# Patient Record
Sex: MALE | Race: WHITE | NOT HISPANIC OR LATINO | Employment: UNEMPLOYED | URBAN - METROPOLITAN AREA
[De-identification: names, ages, dates, MRNs, and addresses within clinical notes are randomized per-mention and may not be internally consistent; named-entity substitution may affect disease eponyms.]

---
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
## 2023-08-31 ENCOUNTER — OFFICE VISIT (OUTPATIENT)
Dept: URGENT CARE | Facility: URGENT CARE | Age: 23
End: 2023-08-31
Payer: COMMERCIAL

## 2023-08-31 VITALS
RESPIRATION RATE: 18 BRPM | TEMPERATURE: 100 F | SYSTOLIC BLOOD PRESSURE: 113 MMHG | DIASTOLIC BLOOD PRESSURE: 62 MMHG | WEIGHT: 165 LBS | HEART RATE: 116 BPM | BODY MASS INDEX: 22.59 KG/M2 | OXYGEN SATURATION: 98 %

## 2023-08-31 DIAGNOSIS — R10.84 ABDOMINAL PAIN, GENERALIZED: Primary | ICD-10-CM

## 2023-08-31 DIAGNOSIS — R00.0 TACHYCARDIA: ICD-10-CM

## 2023-08-31 DIAGNOSIS — R11.2 NAUSEA AND VOMITING, UNSPECIFIED VOMITING TYPE: ICD-10-CM

## 2023-08-31 DIAGNOSIS — R50.9 FEVER, UNSPECIFIED FEVER CAUSE: ICD-10-CM

## 2023-08-31 PROCEDURE — 99203 OFFICE O/P NEW LOW 30 MIN: CPT | Performed by: NURSE PRACTITIONER

## 2023-08-31 RX ORDER — FAMOTIDINE 20 MG/1
20 TABLET, FILM COATED ORAL
COMMUNITY
Start: 2023-08-23 | End: 2023-09-06

## 2023-09-01 NOTE — PROGRESS NOTES
Assessment & Plan     Abdominal pain, generalized    Tachycardia    Fever, unspecified fever cause    Nausea and vomiting, unspecified vomiting type       Recommend further evaluation in emergency room for abdominal pain with fever, nausea and vomiting, tachycardia as stat labs indicated, possibly advanced imaging with concern for systemic infection. Patient agreeable and declines ambulance and is discharged in stable condition. Recommend establish care with PCP      Aileen Carmona NP  [facility] [place]    Subjective     Prashant is a 22 year old male who presents to clinic today for the following health issues:  Chief Complaint   Patient presents with    Urgent Care    Abdominal Pain     Per patient states he has been having abdominal pain and fever, was seen in ED and given pepcid which was working but states yesterday started having symptoms again      Abdominal Pain    Location: periumbilical and left side  Radiation: back    Severity: 2 on a scale of 1-10.  Was 8/10 before tylenol just now  Duration: unsure  Course of Illness: worsening.  Exacerbated by: nothing  Relieved by: tylenol helps temporarily, last had 2 hours ago  Associated Symptoms: fever of 102F, emesis this morning  Denies diarrhea, dysuria, urgency, frequency, constipation, sore throat.   Surgical History: infant hernia repair  He was evalauted in ED 8/22/223 for epigastric pain and prescribed Pepcid which had been helping and now isn't helping. He was advised to return with fever of 102 which he states wasn't aware of. He does not have PCP  He hasn't been able to eat for 24 hours since he is afraid of throwing up.  He has been able to drink fluids and void      Problem list, Medication list, Allergies, and Medical history reviewed in EPIC.    ROS:  Review of systems negative except for noted above        Objective    /62   Pulse 116   Temp 100  F (37.8  C) (Tympanic)   Resp 18   Wt 74.8 kg (165 lb)   SpO2 98%    BMI 22.59 kg/m    Physical Exam  Constitutional:       General: He is not in acute distress.     Appearance: He is not toxic-appearing or diaphoretic.   Cardiovascular:      Rate and Rhythm: Regular rhythm. Tachycardia present.      Heart sounds: Normal heart sounds.   Abdominal:      General: Abdomen is flat. Bowel sounds are normal. There is no distension.      Palpations: Abdomen is rigid.      Tenderness: There is generalized abdominal tenderness. There is no right CVA tenderness, left CVA tenderness, guarding or rebound.      Comments: Severe tenderness with palpation throughout abdomen   Skin:     General: Skin is warm and dry.   Neurological:      Mental Status: He is alert.

## 2025-05-20 ENCOUNTER — OFFICE VISIT (OUTPATIENT)
Dept: FAMILY MEDICINE | Facility: CLINIC | Age: 25
End: 2025-05-20
Payer: COMMERCIAL

## 2025-05-20 ENCOUNTER — NURSE TRIAGE (OUTPATIENT)
Dept: PEDIATRICS | Facility: CLINIC | Age: 25
End: 2025-05-20

## 2025-05-20 VITALS
WEIGHT: 177 LBS | DIASTOLIC BLOOD PRESSURE: 75 MMHG | BODY MASS INDEX: 23.98 KG/M2 | RESPIRATION RATE: 16 BRPM | HEIGHT: 72 IN | OXYGEN SATURATION: 98 % | SYSTOLIC BLOOD PRESSURE: 120 MMHG | HEART RATE: 95 BPM | TEMPERATURE: 98 F

## 2025-05-20 DIAGNOSIS — F48.1: ICD-10-CM

## 2025-05-20 DIAGNOSIS — F44.9 DISASSOCIATION DISORDER: Primary | ICD-10-CM

## 2025-05-20 PROCEDURE — 3078F DIAST BP <80 MM HG: CPT | Performed by: INTERNAL MEDICINE

## 2025-05-20 PROCEDURE — 90471 IMMUNIZATION ADMIN: CPT | Performed by: INTERNAL MEDICINE

## 2025-05-20 PROCEDURE — 90715 TDAP VACCINE 7 YRS/> IM: CPT | Performed by: INTERNAL MEDICINE

## 2025-05-20 PROCEDURE — 99213 OFFICE O/P EST LOW 20 MIN: CPT | Mod: 25 | Performed by: INTERNAL MEDICINE

## 2025-05-20 PROCEDURE — 3074F SYST BP LT 130 MM HG: CPT | Performed by: INTERNAL MEDICINE

## 2025-05-20 PROCEDURE — 1126F AMNT PAIN NOTED NONE PRSNT: CPT | Performed by: INTERNAL MEDICINE

## 2025-05-20 ASSESSMENT — ASTHMA QUESTIONNAIRES
QUESTION_1 LAST FOUR WEEKS HOW MUCH OF THE TIME DID YOUR ASTHMA KEEP YOU FROM GETTING AS MUCH DONE AT WORK, SCHOOL OR AT HOME: NONE OF THE TIME
QUESTION_4 LAST FOUR WEEKS HOW OFTEN HAVE YOU USED YOUR RESCUE INHALER OR NEBULIZER MEDICATION (SUCH AS ALBUTEROL): NOT AT ALL
QUESTION_2 LAST FOUR WEEKS HOW OFTEN HAVE YOU HAD SHORTNESS OF BREATH: NOT AT ALL
QUESTION_5 LAST FOUR WEEKS HOW WOULD YOU RATE YOUR ASTHMA CONTROL: COMPLETELY CONTROLLED
QUESTION_3 LAST FOUR WEEKS HOW OFTEN DID YOUR ASTHMA SYMPTOMS (WHEEZING, COUGHING, SHORTNESS OF BREATH, CHEST TIGHTNESS OR PAIN) WAKE YOU UP AT NIGHT OR EARLIER THAN USUAL IN THE MORNING: NOT AT ALL
ACT_TOTALSCORE: 25

## 2025-05-20 ASSESSMENT — PAIN SCALES - GENERAL: PAINLEVEL_OUTOF10: NO PAIN (0)

## 2025-05-20 NOTE — PROGRESS NOTES
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  The foreign body was removed under general anesthesia.  Since that time he has had episodes of feeling like he is not connected to his body.  He does not have seizure activity.  He is aware of his surroundings during the occurrence.  It was intermittent, but now it is more persistent.  Plan: He is wondering if this is a neurologic problem.  I am concerned that this may be more of a psychiatric issue.  I think it is unlikely to be a lesion in his brain.  Will get a CT scan to rule out significant pathology.  Will refer him to psychiatry.  Will also make a referral to neurology.  I think a neurology appointment will likely be further down the road.  I think we will get the answer from the psychiatrist.      Lucho Cerda is a 24 year old, presenting for the following health issues:  Altered Mental Status        5/20/2025    11:47 AM   Additional Questions   Roomed by Melvina PARKS   Accompanied by Self     History of Present Illness       Reason for visit:  Disassociation-derealization full body numbness  Symptom onset:  More than a month  Symptoms include:  Mental groggyness, delay of processing, motor capability impaired  Symptom intensity:  Moderate  Symptom progression:  Staying the same  Had these symptoms before:  Yes  Has tried/received treatment for these symptoms:  No  What makes it better:  Usually when i sleep and wake up its gone,but its not going away this time,its been about 3 days   He is taking medications regularly.      24-year-old male presents for evaluation of altered mental status.  He had a minor surgery on 4/18/2025.  He had a rectal foreign body removed.  Since that time he is having trouble with his thought processes.  He describes having altered sensations all over his body.  He describes a sensation  that he feels like he is not in his own body and that he does not know where his body is in relation to space.  He has a history of obsessive-compulsive disorder, oppositional defiant disorder, skin picking disorder and attention deficit disorder.  He denies any history of great emotional trauma.  He denies having PTSD.  He is currently not on any medications.  He pulls up a website on his phone and points to a website with derealization syndrome.  He feels that this fits his symptomatology.  He also describes what sounds like disassociation.  He does not feel like he is actually experiencing what is going on.  He is not having trouble driving.  He is not having suicidal thoughts.  He does not feel like he is anxious or depressed.  He does note some stress caring for his young child.        Objective    /75   Pulse 95   Temp 98  F (36.7  C) (Tympanic)   Resp 16   Ht 1.829 m (6')   Wt 80.3 kg (177 lb)   SpO2 98%   BMI 24.01 kg/m    Body mass index is 24.01 kg/m .  Physical Exam   GENERAL: alert and no distress  EYES: PERRL, EOMI, conjunctivae and sclerae normal, and fundi benign-no diabetic or hypertensive changes seen  HENT: ear canals and TM's normal, nose and mouth without ulcers or lesions  NECK: no adenopathy, no asymmetry, masses, or scars  RESP: lungs clear to auscultation - no rales, rhonchi or wheezes  CV: regular rate and rhythm, normal S1 S2, no S3 or S4, no murmur, click or rub, no peripheral edema  ABDOMEN: soft, nontender, no hepatosplenomegaly, no masses and bowel sounds normal  MS: no gross musculoskeletal defects noted, no edema  NEURO: Normal strength and tone, mentation intact and speech normal  PSYCH: mentation appears normal, affect normal/bright            Signed Electronically by: Prashant Jasmine MD

## 2025-05-20 NOTE — TELEPHONE ENCOUNTER
Yes, he should be seen. I don't see a need for ER given that the problem has been going on for 4 weeks.  Prashant Jasmine MD on 5/20/2025 at 9:00 AM

## 2025-05-20 NOTE — TELEPHONE ENCOUNTER
Left vague message on Ari's (father) VM. Advised him to keep appointment with Dr. Jasmine for today to be evaluated.     Thank you - Halle Pina, BSN, RN

## 2025-05-20 NOTE — TELEPHONE ENCOUNTER
"Nurse Triage SBAR    Is this a 2nd Level Triage? YES, LICENSED PRACTITIONER REVIEW IS REQUIRED    Situation: Patient father, no c2c, patient sleeping taking information only. Priority triaged after already getting appointment by central scheduling. Abnormal neurologic sensations since having procedure on 04/18 at [facility] to remove foreign body from rectum.     Background: Father describes \"episodes\" of trouble with thought process and neurologic sensation that are recurrent. Denies acute stroke concerns right now, but wondering if Mini strokes ongoing?     Assessment: Patient has episodes that last a few days, then resolves, only to return. Current episode started Sunday. Patient having moments where he is needing to slow down to formulate thought process. Also having decreased sensation to touch over body. Described as similar to when arm falls asleep, can not feel. Father denies patient having facial droop, slurred speech, motor function loss, or headache. Patient has stated he feels like he is not in his own body, and he doesn't know where his body is in relation to space. He is still going to work and driving his car etc.     Protocol Recommended Disposition:   See in Office Today    Recommendation: Per RN protocol patient should be clinic appropriate, but given acute symptoms routing to provider team. Father does not want to take patient to ED, but understands if needing to see neurology.     Routed to provider    Does the patient meet one of the following criteria for ADS visit consideration? 16+ years old, with an [facility] PCP     TIP  Providers, please consider if this condition is appropriate for management at one of our Acute and Diagnostic Services sites.     If patient is a good candidate, please use dotphrase <dot>triageresponse and select Refer to ADS to document.      Reason for Disposition   Patient wants to be seen    Additional Information   Negative: Headache (with neurologic deficit)   Negative: " Unable to urinate (or only a few drops) and bladder feels very full   Negative: Loss of bladder or bowel control (urine or bowel incontinence; wetting self, leaking stool) of new-onset   Negative: Back pain with numbness (loss of sensation) in groin or rectal area   Negative: Neurologic deficit that was brief (now gone), ANY of the following: * Weakness of the face, arm, or leg on one side of the body * Numbness of the face, arm, or leg on one side of the body * Loss of speech or garbled speech   Negative: Patient sounds very sick or weak to the triager   Negative: Neurologic deficit of gradual onset (e.g., days to weeks), ANY of the following: * Weakness of the face, arm, or leg on one side of the body* Numbness of the face, arm, or leg on one side of the body* Loss of speech or garbled speech   Negative: [place] palsy suspected (i.e., weakness only one side of the face, developing over hours to days, no other symptoms)   Negative: Tingling (e.g., pins and needles) of the face, arm or leg on one side of the body, that is present now  (Exceptions: Chronic or recurrent symptom lasting > 4 weeks; or from known cause, such as: bumped elbow, carpal tunnel, pinched nerve.)   Negative: Back pain (with neurologic deficit)   Negative: Neck pain (with neurologic deficit)   Negative: Confusion, disorientation, or hallucinations is main symptom   Negative: Dizziness is main symptom   Negative: Followed a head injury within last 3 days   Negative: SEVERE weakness (e.g., unable to walk or barely able to walk, requires support) and new-onset or getting worse   Negative: Difficult to awaken or acting confused (e.g., disoriented, slurred speech)   Negative: Sudden onset of weakness of the face, arm or leg on one side of the body and present now (Exception: Bell's palsy suspected: weakness on one side of the face developing over hours to days, with no other symptoms.)   Negative: Sudden onset of numbness of the face, arm or leg on one  side of the body and present now   Negative: Sudden onset of loss of speech or garbled speech and present now   Negative: Sounds like a life-threatening emergency to the triager    Protocols used: Neurologic Deficit-A-[place]

## 2025-05-21 ENCOUNTER — TELEPHONE (OUTPATIENT)
Dept: NEUROLOGY | Facility: CLINIC | Age: 25
End: 2025-05-21
Payer: COMMERCIAL

## 2025-05-21 ENCOUNTER — PATIENT OUTREACH (OUTPATIENT)
Dept: CARE COORDINATION | Facility: CLINIC | Age: 25
End: 2025-05-21
Payer: COMMERCIAL

## 2025-05-21 ASSESSMENT — PATIENT HEALTH QUESTIONNAIRE - PHQ9
SUM OF ALL RESPONSES TO PHQ QUESTIONS 1-9: 4
SUM OF ALL RESPONSES TO PHQ QUESTIONS 1-9: 4
10. IF YOU CHECKED OFF ANY PROBLEMS, HOW DIFFICULT HAVE THESE PROBLEMS MADE IT FOR YOU TO DO YOUR WORK, TAKE CARE OF THINGS AT HOME, OR GET ALONG WITH OTHER PEOPLE: VERY DIFFICULT

## 2025-05-21 NOTE — TELEPHONE ENCOUNTER
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  The foreign body was removed under general anesthesia.  Since that time he has had episodes of feeling like he is not connected to his body.  He does not have seizure activity.  He is aware of his surroundings during the occurrence.  It was intermittent, but now it is more persistent.  Plan: He is wondering if this is a neurologic problem.  I am concerned that this may be more of a psychiatric issue.  I think it is unlikely to be a lesion in his brain.  Will get a CT scan to rule out significant pathology.  Will refer him to psychiatry.  Will also make a referral to neurology.  I think a neurology appointment will likely be further down the road.  I think we will get the answer from the psychiatrist.

## 2025-05-21 NOTE — TELEPHONE ENCOUNTER
Spoke to patient, mom was in the background. Relayed that it would be recommended to complete CT, then schedule mental health referral to rule out psychiatric disorder first for a more beneficial neurology visit. Pt agreeable to plan. They are requesting to still schedule neurology visit to be on the books and then will cancel if needed. [employer] link sent to patient to sign-up. He is requesting mental health referral # be sent to him once he signs up.

## 2025-05-21 NOTE — TELEPHONE ENCOUNTER
REASON FOR VISIT: F44.9 (ICD-10-CM) - Disassociation disorder  F48.1 (ICD-10-CM) - Derealization syndrome (H)   PROVIDER: Deangelo Cleveland DO   DATE OF APPT: 5/22/25 @ 11:00 am    NOTES (FOR ALL VISITS) STATUS DETAILS   OFFICE NOTE from referring provider Internal 5/20/25 Prashant Jasmine MD @[facility]     MEDICATION LIST Internal    IMAGING  (FOR ALL VISITS)     CT (HEAD, NECK, SPINE) In process [facility]  ([facility]) 6/17/25 CT Head

## 2025-05-21 NOTE — TELEPHONE ENCOUNTER
[facility] Call Center    Phone Message    May a detailed message be left on voicemail: yes     Reason for Call: Other: Pt attempting to schedule 05/20 referral. Writer uncertain of how this should be scheduled, requesting review    Please contact pt to schedule at [phone]    Action Taken: Message routed to:  [facility] Neurology    Travel Screening: Not Applicable     Date of Service:

## 2025-05-21 NOTE — TELEPHONE ENCOUNTER
With reviewing, it is best to be complete head CT first, then schedule mental health referral. Once these are completed and mental health believes that patient needs to see neurology, then we can assist with scheduling.

## 2025-05-22 ENCOUNTER — PRE VISIT (OUTPATIENT)
Dept: NEUROLOGY | Facility: CLINIC | Age: 25
End: 2025-05-22

## 2025-05-22 ENCOUNTER — VIRTUAL VISIT (OUTPATIENT)
Dept: NEUROLOGY | Facility: CLINIC | Age: 25
End: 2025-05-22
Payer: COMMERCIAL

## 2025-05-22 ENCOUNTER — VIRTUAL VISIT (OUTPATIENT)
Facility: CLINIC | Age: 25
End: 2025-05-22
Attending: INTERNAL MEDICINE
Payer: COMMERCIAL

## 2025-05-22 VITALS — BODY MASS INDEX: 23.7 KG/M2 | HEIGHT: 72 IN | WEIGHT: 175 LBS

## 2025-05-22 DIAGNOSIS — F48.1: ICD-10-CM

## 2025-05-22 DIAGNOSIS — F44.9 DISASSOCIATION DISORDER: Primary | ICD-10-CM

## 2025-05-22 DIAGNOSIS — F44.9 DISASSOCIATION DISORDER: ICD-10-CM

## 2025-05-22 DIAGNOSIS — F90.2 ATTENTION DEFICIT HYPERACTIVITY DISORDER (ADHD), COMBINED TYPE: Primary | ICD-10-CM

## 2025-05-22 ASSESSMENT — ANXIETY QUESTIONNAIRES
1. FEELING NERVOUS, ANXIOUS, OR ON EDGE: SEVERAL DAYS
GAD7 TOTAL SCORE: 3
5. BEING SO RESTLESS THAT IT IS HARD TO SIT STILL: NOT AT ALL
3. WORRYING TOO MUCH ABOUT DIFFERENT THINGS: NOT AT ALL
IF YOU CHECKED OFF ANY PROBLEMS ON THIS QUESTIONNAIRE, HOW DIFFICULT HAVE THESE PROBLEMS MADE IT FOR YOU TO DO YOUR WORK, TAKE CARE OF THINGS AT HOME, OR GET ALONG WITH OTHER PEOPLE: VERY DIFFICULT
GAD7 TOTAL SCORE: 3
2. NOT BEING ABLE TO STOP OR CONTROL WORRYING: NOT AT ALL
6. BECOMING EASILY ANNOYED OR IRRITABLE: NOT AT ALL
7. FEELING AFRAID AS IF SOMETHING AWFUL MIGHT HAPPEN: SEVERAL DAYS

## 2025-05-22 ASSESSMENT — PATIENT HEALTH QUESTIONNAIRE - PHQ9: 5. POOR APPETITE OR OVEREATING: SEVERAL DAYS

## 2025-05-22 ASSESSMENT — PAIN SCALES - GENERAL: PAINLEVEL_OUTOF10: MODERATE PAIN (5)

## 2025-05-22 NOTE — NURSING NOTE
Current patient location: [id number] [place] [id number]    Is the patient currently in the state of [place]? YES    Visit mode: VIDEO    If the visit is dropped, the patient can be reconnected by:VIDEO VISIT: Text to cell phone:   Telephone Information:   Mobile [phone]       Will anyone else be joining the visit? NO  (If patient encounters technical issues they should call [phone]    Are changes needed to the allergy or medication list? No    Are refills needed on medications prescribed by this physician? NO    Rooming Documentation:  Questionnaire(s) completed    Reason for visit: Consult    Deanna MARTINES

## 2025-05-22 NOTE — LETTER
5/22/2025       RE: Prashant Doe  [address] [facility] [id number]     Dear Colleague,    Thank you for referring your patient, Prashant Doe, to the [facility] [place] at [facility]. Please see a copy of my visit note below.    [facility] Neurology Consultation    Prashant Doe MRN# [id number]   Age: 24 year old YOB: 2000     Requesting physician: No ref. 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  His presentation today is further supportive of a psychological response to a physical/emotional trauma as he is having symptoms of dissociation and sensory changes during the visit and for the last 4 days.  If this was indeed a focal seizure or generalized seizure there is little chance the patient could have that condition for 4 days and be as active, and responsive as he is on interview.  The lack of other neurological symptoms is also reassuring that this isn't some autoimmune receptor encephalitis, infection, or atypical seizure disorder that happened to correlate with the prior surgery.  He could continue with plans for head imaging as ordered, but if new symptoms did emerge then consideration for MRI or EEG may be made.    We discussed that I think his best option for management of his symptoms is psychological at this time, and he is meeting with a provider later today to go over treatment. He doesn't meet criteria for mandatory driving restrictions as per [place] state law, but I advised that if he is uncomfortable driving he could stop for  "now and see how psychology based treatment goes.    Plan:  - Continue with plans for CT head  - Can consider EEG or MRI brain if new neurological symptoms were to occur   - Altered mental status for brief periods of time, stereotyped behaviors/movements, auras/agata-vu/smells/visual hallucinations    Follow up in Neurology clinic  should new concerns arise.    ALBERTA Cleveland D.O.   of Neurology    Total time today (55 min) in this patient encounter was spent on pre-charting, counseling and/or coordination of care.  The patient is in agreement with this plan and has no further questions.      History of Presenting Symptoms:   Prashant Doe is a 24 year old male who presents today for evaluation of varied events of mental status.  The patient has a pertinent medical history of OCD, ADHD, oppositional defiant disorder, skin picking disorder, and some mild asthma.    The patient was seen 4/18/2025 with [facility] due to having a rectal foreign body. This was removed via a manual transanal approach in the ED.  Following the removal, a nurse triage communication 5/20/2025 indicated the patient was having \"episodes\" of trouble with thought process and decreased sensation to touch all over the body and feeling like they were outside of their body or that they were not in their own body.  PCP visit 5/20/2025 led to both a neurology referral and psychiatric referral.     Today, the patient is with his father today.  He indicates having a few moments where he couldn't feel his body but that his brain works fine occurring after his rectal procedure.  This feeling moves from his head down into his entire body.  This would not allow him to feel other stimulus, such as pain or cold sensations. He has trouble breathing during these events, and feels his heart rate increases during the events.  There is no LOC associated with these events.  There is no clear atypical body movements he cannot control.  He " just feels dissociative from his body during the event. Events initially were lasting a few hours to a day.  He has had about 4 events in total. He currently has an event, which is going on for 4 days.    He describes having a concussion as a child, and that this led to increased stress and headaches especially when looking at bright lights. There is no history of seizure. No milestones declines are noted. He did well in school. 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       Medications:     No current outpatient medications on file.     No current facility-administered medications for this visit.      Physical Exam:   General: Seated comfortably in no acute distress. Head down, low volume voice, not making much eye contact with camera or dad in room.  Neurologic:     Mental Status: Fully alert, attentive and oriented. Speech clear and fluent, no paraphasic errors.     Cranial Nerves: EOMI with normal smooth pursuit. Facial movements symmetric. Hearing not formally tested but intact to conversation.  No dysarthria.     Motor: No tremors or other abnormal movements observed.          Data: Pertinent prior to visit   Imaging:  None pertinent      Virtual Visit Details    Type of service:  Video Visit   Video Start Time: 1055  Video End Time:11:23 AM    Originating Location (pt. Location): Home    Distant Location (provider location):  On-site  Platform used for Video Visit: AmWell    Again, thank you for allowing me to participate in the care of your patient.      Sincerely,    Deangelo Cleveland, DO

## 2025-05-22 NOTE — PROGRESS NOTES
[facility] Neurology Consultation    Prashant Doe MRN# [id number]   Age: 24 year old YOB: 2000     Requesting physician: No ref. 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  His presentation today is further supportive of a psychological response to a physical/emotional trauma as he is having symptoms of dissociation and sensory changes during the visit and for the last 4 days.  If this was indeed a focal seizure or generalized seizure there is little chance the patient could have that condition for 4 days and be as active, and responsive as he is on interview.  The lack of other neurological symptoms is also reassuring that this isn't some autoimmune receptor encephalitis, infection, or atypical seizure disorder that happened to correlate with the prior surgery.  He could continue with plans for head imaging as ordered, but if new symptoms did emerge then consideration for MRI or EEG may be made.    We discussed that I think his best option for management of his symptoms is psychological at this time, and he is meeting with a provider later today to go over treatment. He doesn't meet criteria for mandatory driving restrictions as per [place] state law, but I advised that if he is uncomfortable driving he could stop for now and see how psychology based treatment goes.    Plan:  - Continue with plans for CT head  - Can consider EEG or MRI brain if new neurological symptoms were to occur   - Altered mental status for brief periods of time, stereotyped behaviors/movements, auras/agata-vu/smells/visual hallucinations    Follow up in  "Neurology clinic  should new concerns arise.    ALBERTA Cleveland D.O.   of Neurology    Total time today (55 min) in this patient encounter was spent on pre-charting, counseling and/or coordination of care.  The patient is in agreement with this plan and has no further questions.      History of Presenting Symptoms:   Prashant Doe is a 24 year old male who presents today for evaluation of varied events of mental status.  The patient has a pertinent medical history of OCD, ADHD, oppositional defiant disorder, skin picking disorder, and some mild asthma.    The patient was seen 4/18/2025 with [facility] due to having a rectal foreign body. This was removed via a manual transanal approach in the ED.  Following the removal, a nurse triage communication 5/20/2025 indicated the patient was having \"episodes\" of trouble with thought process and decreased sensation to touch all over the body and feeling like they were outside of their body or that they were not in their own body.  PCP visit 5/20/2025 led to both a neurology referral and psychiatric referral.     Today, the patient is with his father today.  He indicates having a few moments where he couldn't feel his body but that his brain works fine occurring after his rectal procedure.  This feeling moves from his head down into his entire body.  This would not allow him to feel other stimulus, such as pain or cold sensations. He has trouble breathing during these events, and feels his heart rate increases during the events.  There is no LOC associated with these events.  There is no clear atypical body movements he cannot control.  He just feels dissociative from his body during the event. Events initially were lasting a few hours to a day.  He has had about 4 events in total. He currently has an event, which is going on for 4 days.    He describes having a concussion as a child, and that this led to increased stress and headaches especially " when looking at bright lights. There is no history of seizure. No milestones declines are noted. He did well in school. 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       Medications:     No current outpatient medications on file.     No current facility-administered medications for this visit.      Physical Exam:   General: Seated comfortably in no acute distress. Head down, low volume voice, not making much eye contact with camera or dad in room.  Neurologic:     Mental Status: Fully alert, attentive and oriented. Speech clear and fluent, no paraphasic errors.     Cranial Nerves: EOMI with normal smooth pursuit. Facial movements symmetric. Hearing not formally tested but intact to conversation.  No dysarthria.     Motor: No tremors or other abnormal movements observed.          Data: Pertinent prior to visit   Imaging:  None pertinent      Virtual Visit Details    Type of service:  Video Visit   Video Start Time: 1055  Video End Time:11:23 AM    Originating Location (pt. Location): Home    Distant Location (provider location):  On-site  Platform used for Video Visit: Well

## 2025-05-22 NOTE — PROGRESS NOTES
"[facility]         PATIENT'S NAME: Prashant Doe  PREFERRED NAME: Prashant  PRONOUNS:      MRN: [id number]  : 2000  ADDRESS: [id number] [place] [id number]  [facility]  [id number]  DATE OF SERVICE: 25  START TIME: 1:00 PM  END TIME: ***  PREFERRED PHONE: [phone]  May we leave a program related message: Yes  EMERGENCY CONTACT: was obtained     ELIAS DOE (Mother)  [phone] (Mobile)   .  SERVICE MODALITY:  Video Visit:      Provider verified identity through the following two step process.  Patient provided:  Patient  and Patient address    Telemedicine Visit: The patient's condition can be safely assessed and treated via synchronous audio and visual telemedicine encounter.      Reason for Telemedicine Visit: Patient convenience (e.g. access to timely appointments / distance to available provider)    Originating Site (Patient Location): Patient's home    Distant Site (Provider Location): Provider Remote Setting- Home Office    Consent:  The patient/guardian has verbally consented to: the potential risks and benefits of telemedicine (video visit) versus in person care; bill my insurance or make self-payment for services provided; and responsibility for payment of non-covered services.     Patient would like the video invitation sent by:  My Chart    Mode of Communication:  Video Conference via Amwell    Distant Location (Provider):  Off-site    As the provider I attest to compliance with applicable laws and regulations related to telemedicine.    UNIVERSAL ADULT Mental Health DIAGNOSTIC ASSESSMENT    Identifying Information:  Patient is a 24 year old,   individual.  Patient was referred for an assessment by self.  Patient attended the session alone.    Chief Complaint:   The reason for seeking services at this time is: \"Neurologic issues diagnosis\". Patient reports he had a minor surgery a month ago where he had an item removed from his rectum. He " required anaesthesia for this procedure. A week later he started having full body numbness a month ago; he has not experienced this for about 4 days. He reports general feeling of fogginess and haziness. He reports he can feel his body 'pretty good.' He has headaches, has difficulty breathing and his heart-rate increases which he acknowledges 'could be all in my head.' He reports onset of new symptom of brown and yellow mucus from back of his throat. He reports this is similar to symptoms of ADHD which he has been previously diagnosed with. The problem(s) began 04/19/25.    Patient has not attempted to resolve these concerns in the past.    Social/Family History:  Patient reported they grew up in other [place].  They were raised by adopted parents.  Parents were always together.  He reports his birth mother used drugs, he believes meth, while pregnant. Patient reported that their childhood was loving, nothing but good things, happy loving they took care of me perfect childhood. He reports his other siblings were also adopted, his 4 older siblings all have developmental disabilities. His younger brother is his full sibling (-1).  His oldest sister passed away while he was in high school - she had cerebral palsy. Patient described their current relationships with family of origin as good.     The patient describes their cultural background as .  Cultural influences and impact on patient's life structure, values, norms, and healthcare: [employer].  Contextual influences on patient's health include: Individual Factors new onset symptoms and Learning Environment Factors ADHD by history.    These factors will be addressed in the Preliminary Treatment plan. Patient identified their preferred language to be English. Patient reported they does not need the assistance of an  or other support involved in therapy.     Patient reported had no significant delays in developmental tasks.   Patient's highest  education level was high school graduate  .  Patient identified the following learning problems: attention and concentration.  Modifications will not be used to assist communication in therapy.  Patient reports they are not  able to understand written materials.    Patient reported the following relationship history current relationship.  Patient's current relationship status is has a partner or significant other for 3 years.   Patient identified their sexual orientation as heterosexual.  Patient reported having 1 child(johnnie) - 11 months old. Patient identified partner; parents as part of their support system.  Patient identified the quality of these relationships as stable and meaningful,  .      Patient's current living/housing situation involves staying with someone.  The immediate members of family and household include Ari Doe, 61,Father Mother, 3 siblings and they report that housing is stable.    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 He works on average 60 hours a week.  Patient reports their finances are obtained through employment; parents. Patient does not identify finances as a current stressor.      Patient reported that they have not been involved with the legal system.   Patient does not report being under probation/ parole/ jurisdiction. They are not under any current court jurisdiction. .    Patient's Strengths and Limitations:  Patient identified the following strengths or resources that will help them succeed in treatment: Synagogue / Yarsani, exercise routine, jad / spirituality, friends / good social support, and family support. Things that may interfere with the patient's success in treatment include: none identified.     Assessments:  The following assessments were completed by patient for this visit:  PHQ9:       5/21/2025     9:56 PM   PHQ-9 SCORE   PHQ-9 Total Score Zhout 4 (Minimal  depression)   PHQ-9 Total Score 4        Patient-reported     GAD7:       5/22/2025     1:12 PM   OKSANA-7 SCORE   Total Score 3     CAGE-AID:       5/21/2025    10:19 PM   CAGE-AID Total Score   Total Score 0    Total Score MyChart 0 (A total score of 2 or greater is considered clinically significant)       Patient-reported     PROMIS 10-Global Health (only subscores and total score):       5/21/2025    10:18 PM   PROMIS-10 Scores Only   Global Mental Health Score 17    Global Physical Health Score 18    PROMIS TOTAL - SUBSCORES 35        Patient-reported     [place] Suicide Severity Rating Scale (Lifetime/Recent)      5/22/2025     1:11 PM   [place] Suicide Severity Rating (Lifetime/Recent)   1. Wish to be Dead (Lifetime) N   1. Wish to be Dead (Past 1 Month) N   2. Non-Specific Active Suicidal Thoughts (Lifetime) N   Deterrents (Lifetime) 0   Deterrents (Past 1 Month) 0   Reasons for Ideation (Lifetime) 0   Reasons for Ideation (Past 1 Month) 0   Actual Attempt (Lifetime) N   Has subject engaged in non-suicidal self-injurious behavior? (Lifetime) N   Interrupted Attempts (Lifetime) N   Aborted or Self-Interrupted Attempt (Lifetime) N   Preparatory Acts or Behavior (Lifetime) N   Calculated C-SSRS Risk Score (Lifetime/Recent) No Risk Indicated       Personal and Family Medical History:  Patient does not report a family history of mental health concerns.  Patient reports family history includes Unknown/Adopted in his father and mother. He was adopted..     Patient does report Mental Health Diagnosis and/or Treatment.  Patient reported the following previous diagnoses which include(s):  ADHD, OCD in the past .  Patient reported symptoms began age 9-10.  Patient has received mental health services in the past:    therapy .  Psychiatric Hospitalizations: none  Patient denies a history of civil commitment.      Currently, patient    is not receiving other mental health services.  These include none.  He is looking for  therapy through [facility].     Patient has had a physical exam to rule out medical causes for current symptoms.  Date of last physical exam was within the past year. Client was encouraged to follow up with PCP if symptoms were to develop. The patient has a [facility] Provider, who is named No Ref-Primary, Physician..  Patient reports no current medical and/or dental concerns.  Patient denies any issues with pain..   There are significant appetite / nutritional concerns / weight changes - he reports he is not receiving cues to eat or drink and cannot feel when he is satiated. He reports these symptoms have just started in the past 4 days.   Patient does report a history of head injury / trauma / cognitive impairment. He reports concussion in middle school while playing hockey. Reports his brain feels numb, he feels dissociated from his body.     Patient reports current meds as:   No current outpatient medications on file.     No current facility-administered medications for this visit.       Medication Adherence:  Patient reports not currently prescribed.  Not currently prescribed psychotropic medications.    Patient Allergies:    Allergies   Allergen Reactions    Molds & Smuts Rash     Indoor and outdoor molds       Medical History:    Past Medical History:   Diagnosis Date    Asthma     RAD (reactive airway disease)          Current Mental Status Exam:   Appearance:  {Appearance:500537}   Eye Contact:  {Eye Contact:472440}  Psychomotor:  {Psychomotor:971809}      Gait / station:  {Gait:9919204}  Attitude / Demeanor: {Attitude:318463}  Speech      Rate / Production: {Rate/Production:602825}      Volume:  {Volume:893070} volume      Language:  {Language:443842}  Mood:   {Mood:479267}  Affect:   {Affect:165076}   Thought Content: {Thought Content:028923}  Thought Process: {Thought Form:279033}       Associations: {Associations:865614}  Insight:   {Insight:426185}  Judgment:  {Judgment:692471}  Orientation:  {Orientation:805692}  Attention/concentration: {Attention:047030}    Substance Use:   Patient {DID:877206} report a family history of substance use concerns; see medical history section for details.  Patient {RECEIVED CHEMICAL DEPENDENCY TREATMENT:696591}.  Patient {HAS HAS NOT:275393} ever been to detox.      Patient {RECEIVING CHEMICAL DEPENDENCY TREATMENT:023437}.           Substance History of use Age of first use Date of last use     Pattern and duration of use (include amounts and frequency)   Alcohol currently use   20 05/19/25 REPORTS SUBSTANCE USE: reports using substance {1-10:586750} times per {DAY WEEK MONTH:261890} and has {1-10:344534} *** at a time.   {OP BEH HEAVIEST USE:691491}   Cannabis   used in the past 17 06/01/19 REPORTS SUBSTANCE USE: N/A     Amphetamines   never used     REPORTS SUBSTANCE USE: N/A   Cocaine/crack    never used       REPORTS SUBSTANCE USE: N/A   Hallucinogens never used         REPORTS SUBSTANCE USE: N/A   Inhalants never used         REPORTS SUBSTANCE USE: N/A   Heroin never used         REPORTS SUBSTANCE USE: N/A   Other Opiates never used     REPORTS SUBSTANCE USE: N/A   Benzodiazepine   never used     REPORTS SUBSTANCE USE: N/A   Barbiturates never used     REPORTS SUBSTANCE USE: N/A   Over the counter meds used in the past 18 05/19/25 REPORTS SUBSTANCE USE: reports using substance {1-10:654188} times per {DAY WEEK MONTH:232246} and has {1-10:710355} *** at a time.   {OP BEH HEAVIEST USE:498817}   Caffeine currently use 18   REPORTS SUBSTANCE USE: reports using substance {1-10:437348} times per {DAY WEEK MONTH:981227} and has {1-10:090021} *** at a time.   {OP BEH HEAVIEST USE:200970}   Nicotine  used in the past 17 06/01/19 REPORTS SUBSTANCE USE: N/A   Other substances not listed above:  Identify:  never used     REPORTS SUBSTANCE USE: N/A     Patient reported  the following problems as a result of their substance use: no problems, not applicable.  Patient reports obtaining substances from ***.    Substance Use: {OP BEH SUBSTANCE USE SYMPTOMS:855172}    Based on the CAGE score of 0 and clinical interview there  {Indications:003118}.    Significant Losses / Trauma / Abuse / Neglect Issues:   Patient did not  serve in the .  There are not indications or report of significant loss, trauma, abuse or neglect issues related to: are no indications and client denies any losses, trauma, abuse, or neglect concerns.  Patient has not been a victim of exploitation.  Concerns for possible neglect are not present.     Safety Assessment:   Patient {OP BEH HOMICIDAL IDEATION:063256}  Patient {OP BEH SI FOR DA:360667}  Patient {OP BEH SIB FOR DA:276917}  Patient {OP BEH STEVE RISK BEHAVIORS:833802} associated with substance use.  Patient {OP BEH MH RISK BEHAVIORS:004008} associated with mental health symptoms.  Patient {OP BEH PERSONAL SAFETY CONCERNS:517565}  Patient {OP BEH ASSAULT FOR DA:905723}  Patient {OP BEH SEXUAL OFFENSE HISTORY:309919}   Patient reports there  are not,   firearms in the house.    Patient reports the following protective factors:  forward or future oriented thinking; dedication to family or friends; safe and stable environment; regular sleep; effectively controls impulses; regular physical activity; sense of belonging; purpose; secure attachment; help seeking behaviors when distressed; abstinence from substances; living with other people; daily obligations; structured day; effective problem solving skills; commitment to well being; sense of meaning; positive social skills; healthy fear of risky behaviors or pain; financial stability; strong sense of self worth or esteem; sense of personal control or determination    Risk Plan:  See Recommendations for Safety and Risk Management Plan    Review of Symptoms per patient report:   Depression: Feelings of  helplessness  Priscila:  No Symptoms  Psychosis: No Symptoms  Anxiety: Physical complaints, such as headaches, stomachaches, muscle tension, Psychomotor agitation, and Poor concentration  Panic:  No symptoms  Post Traumatic Stress Disorder:  No Symptoms   Eating Disorder: No Symptoms  ADD / ADHD:  Inattentive, Difficulties listening, Poor task completion, Poor organizational skills, Distractibility, Forgetful, Impulsive, and Hyperactive  Conduct Disorder: No symptoms  Autism Spectrum Disorder: Patient reports he believes he might have been told he was 'low on the spectrum' at about age 17 through [facility].  Obsessive Compulsive Disorder: He reports therapy helped him manage symptoms related to OCD - had to turn light switches on and off in a certain way, he had to start over  Personality Disorders:  none    Patient reports the following compulsive behaviors and treatment history: None.      Diagnostic Criteria:   {OP BEH DSM 5 Criteria:086817}    Functional Status:  Patient reports the following functional impairments:  {SELF-REPORTED FUNCTIONAL IMPAIRMENTS:053573}.     {OP BEH LOCUS:576106}    Clinical Summary:  1. Psychosocial Factors:  {PSYCHOSOCIAL FACTORS:819256}.  Cultural and Contextual Factors: ***  2. Principal DSM5 Diagnoses  (Sustained by DSM5 Criteria Listed Above):   {DSM5 MH Diagnosis:783494}.  3. Other Diagnoses that is relevant to services:   {DSM5 MH Diagnosis:108999}.  4. Provisional Diagnosis:  {DSM5 MH Diagnosis:156466} as evidenced by *** .  5. Prognosis: {Expected Outcomes / Prognosis:254946238}.  6. Likely consequences of symptoms if not treated: ***.  7. Patient strengths include:  {Client Strengths:5669943} .     Recommendations:     1. Plan for Safety and Risk Management:   Safety and Risk: {SAFETY PLAN:617444}        Report to child / adult protection services was {Completed:596495}.     2. Patient's identified {OP BEH IDENTIFIED ISSUES:441083}.     3. Initial Treatment will focus on:  "   {Preliminary Focus:156703}.     4. Resources/Service Plan:    services {Not indicated / Used:816696}.   Modifications to assist communication {Not indicated / Used:090233}.   Additional disability accommodations {OP BEH ACCOMODATIONS:168474}.      5. Collaboration:   Collaboration / coordination of treatment will be initiated with the following  support professionals: {OP BEH COLLABORATION / REFERRAL:735273}.      6.  Referrals:   The following referral(s) will be initiated: {OP BEH OVERVIEW  PROGRAMS:530297}.       A Release of Information has been obtained for the following: {OP BEH [facility]UNITY COLLABORATION / REFERRAL:751050}.     Clinical Substantiation/medical necessity for the above recommendations:  ***.    7. STEVE:    STEVE:  {Alcohol:172373::\"Discussed the general effects of drugs and alcohol on health and well-being\"}. Provider gave patient printed information about the  effects of chemical use on their health and well being. Recommendations:  *** .     8. Records:   These {OP BEH RECORD REVIEW:471903::\"were not available for review\"} at time of assessment.   Information in this assessment was obtained from the medical record and  provided by {PATIENT. FAMILY:148218} who is a {h:861138} historian.    {Access to Records:436070}.    9.   Interactive Complexity: {91529 add on - Interactive Complexity:965355}    10. Safety Plan: {Safety Plan:590700}    Provider Name/ Credentials:  ***  May 22, 2025          "

## 2025-05-22 NOTE — TELEPHONE ENCOUNTER
Patient rescheduled himself on 5/22 despite recommendations. Nothing further needed with scheduling.

## 2025-06-01 ENCOUNTER — HEALTH MAINTENANCE LETTER (OUTPATIENT)
Age: 25
End: 2025-06-01

## 2025-06-17 ENCOUNTER — HOSPITAL ENCOUNTER (OUTPATIENT)
Dept: CT IMAGING | Facility: CLINIC | Age: 25
Discharge: HOME | End: 2025-06-17
Attending: INTERNAL MEDICINE
Payer: COMMERCIAL

## 2025-06-17 DIAGNOSIS — F44.9 DISASSOCIATION DISORDER: ICD-10-CM

## 2025-06-17 DIAGNOSIS — F48.1: ICD-10-CM

## 2025-06-17 PROCEDURE — 70450 CT HEAD/BRAIN W/O DYE: CPT

## 2025-06-23 ENCOUNTER — RESULTS FOLLOW-UP (OUTPATIENT)
Dept: INTERNAL MEDICINE | Facility: CLINIC | Age: 25
End: 2025-06-23